# Patient Record
Sex: FEMALE | Race: WHITE | NOT HISPANIC OR LATINO
[De-identification: names, ages, dates, MRNs, and addresses within clinical notes are randomized per-mention and may not be internally consistent; named-entity substitution may affect disease eponyms.]

---

## 2017-03-22 ENCOUNTER — TRANSCRIPTION ENCOUNTER (OUTPATIENT)
Age: 47
End: 2017-03-22

## 2022-08-19 ENCOUNTER — NON-APPOINTMENT (OUTPATIENT)
Age: 52
End: 2022-08-19

## 2024-06-11 ENCOUNTER — INPATIENT (INPATIENT)
Facility: HOSPITAL | Age: 54
LOS: 0 days | Discharge: HOME | End: 2024-06-12
Attending: HOSPITALIST | Admitting: HOSPITALIST
Payer: COMMERCIAL

## 2024-06-11 VITALS
WEIGHT: 250 LBS | TEMPERATURE: 98 F | HEART RATE: 68 BPM | RESPIRATION RATE: 17 BRPM | DIASTOLIC BLOOD PRESSURE: 80 MMHG | HEIGHT: 68 IN | OXYGEN SATURATION: 100 % | SYSTOLIC BLOOD PRESSURE: 128 MMHG

## 2024-06-11 DIAGNOSIS — I82.409 ACUTE EMBOLISM AND THROMBOSIS OF UNSPECIFIED DEEP VEINS OF UNSPECIFIED LOWER EXTREMITY: ICD-10-CM

## 2024-06-11 DIAGNOSIS — Z29.9 ENCOUNTER FOR PROPHYLACTIC MEASURES, UNSPECIFIED: ICD-10-CM

## 2024-06-11 DIAGNOSIS — I82.402 ACUTE EMBOLISM AND THROMBOSIS OF UNSPECIFIED DEEP VEINS OF LEFT LOWER EXTREMITY: ICD-10-CM

## 2024-06-11 DIAGNOSIS — I10 ESSENTIAL (PRIMARY) HYPERTENSION: ICD-10-CM

## 2024-06-11 LAB
ALBUMIN SERPL ELPH-MCNC: 4 G/DL — SIGNIFICANT CHANGE UP (ref 3.3–5)
ALP SERPL-CCNC: 85 U/L — SIGNIFICANT CHANGE UP (ref 40–120)
ALT FLD-CCNC: 13 U/L — SIGNIFICANT CHANGE UP (ref 4–33)
ANION GAP SERPL CALC-SCNC: 14 MMOL/L — SIGNIFICANT CHANGE UP (ref 7–14)
APTT BLD: 25.8 SEC — SIGNIFICANT CHANGE UP (ref 24.5–35.6)
APTT BLD: >200 SEC — SIGNIFICANT CHANGE UP (ref 24.5–35.6)
AST SERPL-CCNC: 33 U/L — HIGH (ref 4–32)
BASOPHILS # BLD AUTO: 0.05 K/UL — SIGNIFICANT CHANGE UP (ref 0–0.2)
BASOPHILS NFR BLD AUTO: 0.6 % — SIGNIFICANT CHANGE UP (ref 0–2)
BILIRUB SERPL-MCNC: 0.7 MG/DL — SIGNIFICANT CHANGE UP (ref 0.2–1.2)
BUN SERPL-MCNC: 12 MG/DL — SIGNIFICANT CHANGE UP (ref 7–23)
CALCIUM SERPL-MCNC: 9.5 MG/DL — SIGNIFICANT CHANGE UP (ref 8.4–10.5)
CHLORIDE SERPL-SCNC: 107 MMOL/L — SIGNIFICANT CHANGE UP (ref 98–107)
CO2 SERPL-SCNC: 20 MMOL/L — LOW (ref 22–31)
CREAT SERPL-MCNC: 0.68 MG/DL — SIGNIFICANT CHANGE UP (ref 0.5–1.3)
CRP SERPL-MCNC: 17.1 MG/L — HIGH
EGFR: 104 ML/MIN/1.73M2 — SIGNIFICANT CHANGE UP
EOSINOPHIL # BLD AUTO: 0.42 K/UL — SIGNIFICANT CHANGE UP (ref 0–0.5)
EOSINOPHIL NFR BLD AUTO: 5.3 % — SIGNIFICANT CHANGE UP (ref 0–6)
ERYTHROCYTE [SEDIMENTATION RATE] IN BLOOD: 11 MM/HR — SIGNIFICANT CHANGE UP (ref 4–25)
GLUCOSE SERPL-MCNC: 95 MG/DL — SIGNIFICANT CHANGE UP (ref 70–99)
HCT VFR BLD CALC: 40 % — SIGNIFICANT CHANGE UP (ref 34.5–45)
HCT VFR BLD CALC: 44.4 % — SIGNIFICANT CHANGE UP (ref 34.5–45)
HGB BLD-MCNC: 12.9 G/DL — SIGNIFICANT CHANGE UP (ref 11.5–15.5)
HGB BLD-MCNC: 14.4 G/DL — SIGNIFICANT CHANGE UP (ref 11.5–15.5)
IANC: 4.92 K/UL — SIGNIFICANT CHANGE UP (ref 1.8–7.4)
IMM GRANULOCYTES NFR BLD AUTO: 0.4 % — SIGNIFICANT CHANGE UP (ref 0–0.9)
INR BLD: 0.98 RATIO — SIGNIFICANT CHANGE UP (ref 0.85–1.18)
LYMPHOCYTES # BLD AUTO: 1.96 K/UL — SIGNIFICANT CHANGE UP (ref 1–3.3)
LYMPHOCYTES # BLD AUTO: 24.9 % — SIGNIFICANT CHANGE UP (ref 13–44)
MCHC RBC-ENTMCNC: 29.7 PG — SIGNIFICANT CHANGE UP (ref 27–34)
MCHC RBC-ENTMCNC: 30.1 PG — SIGNIFICANT CHANGE UP (ref 27–34)
MCHC RBC-ENTMCNC: 32.3 GM/DL — SIGNIFICANT CHANGE UP (ref 32–36)
MCHC RBC-ENTMCNC: 32.4 GM/DL — SIGNIFICANT CHANGE UP (ref 32–36)
MCV RBC AUTO: 92 FL — SIGNIFICANT CHANGE UP (ref 80–100)
MCV RBC AUTO: 92.7 FL — SIGNIFICANT CHANGE UP (ref 80–100)
MONOCYTES # BLD AUTO: 0.49 K/UL — SIGNIFICANT CHANGE UP (ref 0–0.9)
MONOCYTES NFR BLD AUTO: 6.2 % — SIGNIFICANT CHANGE UP (ref 2–14)
NEUTROPHILS # BLD AUTO: 4.92 K/UL — SIGNIFICANT CHANGE UP (ref 1.8–7.4)
NEUTROPHILS NFR BLD AUTO: 62.6 % — SIGNIFICANT CHANGE UP (ref 43–77)
NRBC # BLD: 0 /100 WBCS — SIGNIFICANT CHANGE UP (ref 0–0)
NRBC # BLD: 0 /100 WBCS — SIGNIFICANT CHANGE UP (ref 0–0)
NRBC # FLD: 0 K/UL — SIGNIFICANT CHANGE UP (ref 0–0)
NRBC # FLD: 0 K/UL — SIGNIFICANT CHANGE UP (ref 0–0)
PLATELET # BLD AUTO: 329 K/UL — SIGNIFICANT CHANGE UP (ref 150–400)
PLATELET # BLD AUTO: 338 K/UL — SIGNIFICANT CHANGE UP (ref 150–400)
POTASSIUM SERPL-MCNC: 5.1 MMOL/L — SIGNIFICANT CHANGE UP (ref 3.5–5.3)
POTASSIUM SERPL-SCNC: 5.1 MMOL/L — SIGNIFICANT CHANGE UP (ref 3.5–5.3)
PROT SERPL-MCNC: 7.6 G/DL — SIGNIFICANT CHANGE UP (ref 6–8.3)
PROTHROM AB SERPL-ACNC: 11 SEC — SIGNIFICANT CHANGE UP (ref 9.5–13)
RBC # BLD: 4.35 M/UL — SIGNIFICANT CHANGE UP (ref 3.8–5.2)
RBC # BLD: 4.79 M/UL — SIGNIFICANT CHANGE UP (ref 3.8–5.2)
RBC # FLD: 14 % — SIGNIFICANT CHANGE UP (ref 10.3–14.5)
RBC # FLD: 14.1 % — SIGNIFICANT CHANGE UP (ref 10.3–14.5)
SODIUM SERPL-SCNC: 141 MMOL/L — SIGNIFICANT CHANGE UP (ref 135–145)
WBC # BLD: 7.87 K/UL — SIGNIFICANT CHANGE UP (ref 3.8–10.5)
WBC # BLD: 8.78 K/UL — SIGNIFICANT CHANGE UP (ref 3.8–10.5)
WBC # FLD AUTO: 7.87 K/UL — SIGNIFICANT CHANGE UP (ref 3.8–10.5)
WBC # FLD AUTO: 8.78 K/UL — SIGNIFICANT CHANGE UP (ref 3.8–10.5)

## 2024-06-11 PROCEDURE — 99223 1ST HOSP IP/OBS HIGH 75: CPT | Mod: GC

## 2024-06-11 PROCEDURE — 93970 EXTREMITY STUDY: CPT | Mod: 26

## 2024-06-11 PROCEDURE — 99285 EMERGENCY DEPT VISIT HI MDM: CPT

## 2024-06-11 PROCEDURE — 73564 X-RAY EXAM KNEE 4 OR MORE: CPT | Mod: 26,LT

## 2024-06-11 PROCEDURE — 99232 SBSQ HOSP IP/OBS MODERATE 35: CPT | Mod: GC

## 2024-06-11 PROCEDURE — 74177 CT ABD & PELVIS W/CONTRAST: CPT | Mod: 26

## 2024-06-11 RX ORDER — HEPARIN SODIUM 5000 [USP'U]/ML
5000 INJECTION INTRAVENOUS; SUBCUTANEOUS EVERY 6 HOURS
Refills: 0 | Status: DISCONTINUED | OUTPATIENT
Start: 2024-06-11 | End: 2024-06-12

## 2024-06-11 RX ORDER — HEPARIN SODIUM 5000 [USP'U]/ML
10000 INJECTION INTRAVENOUS; SUBCUTANEOUS ONCE
Refills: 0 | Status: COMPLETED | OUTPATIENT
Start: 2024-06-11 | End: 2024-06-11

## 2024-06-11 RX ORDER — HEPARIN SODIUM 5000 [USP'U]/ML
INJECTION INTRAVENOUS; SUBCUTANEOUS
Qty: 25000 | Refills: 0 | Status: DISCONTINUED | OUTPATIENT
Start: 2024-06-11 | End: 2024-06-12

## 2024-06-11 RX ORDER — HEPARIN SODIUM 5000 [USP'U]/ML
10000 INJECTION INTRAVENOUS; SUBCUTANEOUS EVERY 6 HOURS
Refills: 0 | Status: DISCONTINUED | OUTPATIENT
Start: 2024-06-11 | End: 2024-06-12

## 2024-06-11 RX ORDER — DIPHENHYDRAMINE HCL 50 MG
50 CAPSULE ORAL ONCE
Refills: 0 | Status: COMPLETED | OUTPATIENT
Start: 2024-06-11 | End: 2024-06-11

## 2024-06-11 RX ORDER — ACETAMINOPHEN 500 MG
650 TABLET ORAL EVERY 6 HOURS
Refills: 0 | Status: DISCONTINUED | OUTPATIENT
Start: 2024-06-11 | End: 2024-06-12

## 2024-06-11 RX ADMIN — HEPARIN SODIUM 10000 UNIT(S): 5000 INJECTION INTRAVENOUS; SUBCUTANEOUS at 12:15

## 2024-06-11 RX ADMIN — HEPARIN SODIUM UNIT(S)/HR: 5000 INJECTION INTRAVENOUS; SUBCUTANEOUS at 18:58

## 2024-06-11 RX ADMIN — HEPARIN SODIUM 0 UNIT(S)/HR: 5000 INJECTION INTRAVENOUS; SUBCUTANEOUS at 20:10

## 2024-06-11 RX ADMIN — Medication 125 MILLIGRAM(S): at 17:24

## 2024-06-11 RX ADMIN — HEPARIN SODIUM 2200 UNIT(S)/HR: 5000 INJECTION INTRAVENOUS; SUBCUTANEOUS at 12:23

## 2024-06-11 RX ADMIN — Medication 50 MILLIGRAM(S): at 21:24

## 2024-06-11 NOTE — H&P ADULT - PROBLEM SELECTOR PLAN 3
Diet: Regular diet   DVT: on Heparin ggt  Dispo: pending clinical course Diet: Regular diet   DVT ppx: on Heparin gtt  Dispo: pending clinical course

## 2024-06-11 NOTE — CONSULT NOTE ADULT - ATTENDING COMMENTS
Patient with acute LLE pain found to have an extensive DVT from CFV to gastrocnemius veins. On hep gtt. C/O venous claudication.  Wisdom score 12. No ulcers or phlegmasia.   CT venogram demonstrates no evidence of extension proximal to inguinal ligament  Given her symptoms, would still be a candidate for mechanical venous thrombectomy  Will discuss with patient

## 2024-06-11 NOTE — H&P ADULT - NSHPREVIEWOFSYSTEMS_GEN_ALL_CORE
REVIEW OF SYSTEMS:    CONSTITUTIONAL: No weakness, fevers or chills  EYES/ENT: No visual changes;  No vertigo or throat pain   NECK: No pain or stiffness  RESPIRATORY: No cough, wheezing, hemoptysis; No shortness of breath  CARDIOVASCULAR: No chest pain or palpitations  GASTROINTESTINAL: No abdominal or epigastric pain. No nausea, vomiting, or hematemesis; No diarrhea or constipation. No melena or hematochezia.  GENITOURINARY: No dysuria, frequency or hematuria  NEUROLOGICAL: No numbness or weakness  SKIN: No itching, rashes Constitutional: No generalized weakness, fevers, chills, or weight loss  Eyes: No visual changes, double vision, or eye pain  Ears, Nose, Mouth, Throat: No runny nose, sinus pain, ear pain, tinnitus, sore throat, dysphagia, or odynophagia  Cardiovascular: No chest pain, palpitations, or LE edema  Respiratory: No cough, wheezing, hemoptysis, or shortness of breath  Gastrointestinal: No abdominal pain, dysphagia, anorexia, nausea/vomiting, diarrhea/constipation, hematemesis, melena, or BRBPR  Genitourinary: No dysuria, frequency, urgency, or hematuria  Musculoskeletal: +LLE pain and swelling. No neck pain or back pain. No joint pain, swelling, or decreased ROM.  Skin: No pruritus, rashes, lesions, or wounds  Neurologic: No syncope, seizures, headache, paresthesias, numbness, or limb weakness  Psychiatric: No depression, anxiety, difficulty concentrating, anhedonia, or lack of energy  Endocrine: No heat/cold intolerance, mood swings, sweats, polydipsia, or polyuria  Hematologic/lymphatic: No purpura, petechia, or prolonged or excessive bleeding after dental extraction / injury  Allergic/Immunologic: No anaphylaxis or allergic response to materials, foods, animals    Positives and pertinent negatives noted and all other systems negative.

## 2024-06-11 NOTE — CONSULT NOTE ADULT - SUBJECTIVE AND OBJECTIVE BOX
VASCULAR SURGERY CONSULT NOTE    HPI:  55 yo F PMHx of HTN PSHx cholecystectomy presents to ED w/ LLE pain and swelling x1 week. States received L knee injection for osteoarthritis 2 weeks ago. No prolonged immobility, long flights.    In the ED, AVSS. CBC, CMP unremarkable. CRP 17. B/L LE duplex shows acute LLE DVT from below knee gastrocnemius PT veins to the popliteal, femoral and common femoral veins.     No personal or family history of blood clots or blood disorders. No OCP use. No h/o of smoking.     Has never seen vascular surgeon. Has varicose veins, asymptomatic. Denies h/o of LE swelling. Denies h/o claudication, rest pain, non-healing wounds.       PAST MEDICAL HISTORY:  Hypertension         PAST SURGICAL HISTORY:  History of cholecystectomy        MEDICATIONS:  heparin   Injectable 01164 Unit(s) IV Push every 6 hours PRN  heparin   Injectable 5000 Unit(s) IV Push every 6 hours PRN  heparin  Infusion.  Unit(s)/Hr IV Continuous <Continuous>      ALLERGIES:  Allergy Status Unknown      VITALS & I/Os:  Vital Signs Last 24 Hrs  T(C): 36.7 (11 Jun 2024 14:11), Max: 36.7 (11 Jun 2024 14:11)  T(F): 98 (11 Jun 2024 14:11), Max: 98 (11 Jun 2024 14:11)  HR: 68 (11 Jun 2024 14:11) (68 - 68)  BP: 124/69 (11 Jun 2024 14:11) (124/69 - 128/80)  RR: 17 (11 Jun 2024 14:11) (17 - 17)  SpO2: 100% (11 Jun 2024 14:11) (100% - 100%)    Parameters below as of 11 Jun 2024 14:11  Patient On (Oxygen Delivery Method): room air        I&O's Summary      PHYSICAL EXAM:  General: No acute distress  Respiratory: Nonlabored  Cardiovascular: RRR  Abdominal: Soft, nondistended, nontender. No rebound or guarding.  Extremities: B/L palpable femoral, popliteal, pedal pulses. B/L LE varicose veins.   LLE edema. Pain on calf compression. Sensory and motor intact, no paresthesias, brisk capillary refill     LABS:                        14.4   7.87  )-----------( 338      ( 11 Jun 2024 11:30 )             44.4     06-11    141  |  107  |  12  ----------------------------<  95  5.1   |  20<L>  |  0.68    Ca    9.5      11 Jun 2024 11:30    TPro  7.6  /  Alb  4.0  /  TBili  0.7  /  DBili  x   /  AST  33<H>  /  ALT  13  /  AlkPhos  85  06-11    Lactate:    PT/INR - ( 11 Jun 2024 11:30 )   PT: 11.0 sec;   INR: 0.98 ratio         PTT - ( 11 Jun 2024 11:30 )  PTT:25.8 sec          Urinalysis Basic - ( 11 Jun 2024 11:30 )    Color: x / Appearance: x / SG: x / pH: x  Gluc: 95 mg/dL / Ketone: x  / Bili: x / Urobili: x   Blood: x / Protein: x / Nitrite: x   Leuk Esterase: x / RBC: x / WBC x   Sq Epi: x / Non Sq Epi: x / Bacteria: x     VASCULAR SURGERY CONSULT NOTE    HPI:  53 yo F PMHx of HTN PSHx cholecystectomy presents to ED w/ LLE pain and swelling x1 week. States received L knee injection for osteoarthritis 2 weeks ago. No prolonged immobility, long flights.    In the ED, AVSS. CBC, CMP unremarkable. CRP 17. B/L LE duplex shows acute LLE DVT from below knee gastrocnemius PT veins to the popliteal, femoral and common femoral veins.     No personal or family history of blood clots or blood disorders. No OCP use. No h/o of smoking.     Has never seen vascular surgeon. Has varicose veins, asymptomatic. Denies h/o of LE swelling. Denies h/o claudication, rest pain, non-healing wounds.       PAST MEDICAL HISTORY:  Hypertension         PAST SURGICAL HISTORY:  History of cholecystectomy        MEDICATIONS:  heparin   Injectable 44192 Unit(s) IV Push every 6 hours PRN  heparin   Injectable 5000 Unit(s) IV Push every 6 hours PRN  heparin  Infusion.  Unit(s)/Hr IV Continuous <Continuous>      ALLERGIES:  Allergy Status Unknown      VITALS & I/Os:  Vital Signs Last 24 Hrs  T(C): 36.7 (11 Jun 2024 14:11), Max: 36.7 (11 Jun 2024 14:11)  T(F): 98 (11 Jun 2024 14:11), Max: 98 (11 Jun 2024 14:11)  HR: 68 (11 Jun 2024 14:11) (68 - 68)  BP: 124/69 (11 Jun 2024 14:11) (124/69 - 128/80)  RR: 17 (11 Jun 2024 14:11) (17 - 17)  SpO2: 100% (11 Jun 2024 14:11) (100% - 100%)    Parameters below as of 11 Jun 2024 14:11  Patient On (Oxygen Delivery Method): room air        I&O's Summary      PHYSICAL EXAM:  General: No acute distress  Respiratory: Nonlabored  Cardiovascular: RRR  Abdominal: Soft, nondistended, nontender. No rebound or guarding.  Extremities: B/L palpable femoral, popliteal, pedal pulses. B/L LE varicose veins.   LLE edema. Pain on calf compression. Sensory and motor intact, no paresthesias, brisk capillary refill     LABS:                        14.4   7.87  )-----------( 338      ( 11 Jun 2024 11:30 )             44.4     06-11    141  |  107  |  12  ----------------------------<  95  5.1   |  20<L>  |  0.68    Ca    9.5      11 Jun 2024 11:30    TPro  7.6  /  Alb  4.0  /  TBili  0.7  /  DBili  x   /  AST  33<H>  /  ALT  13  /  AlkPhos  85  06-11    Lactate:    PT/INR - ( 11 Jun 2024 11:30 )   PT: 11.0 sec;   INR: 0.98 ratio         PTT - ( 11 Jun 2024 11:30 )  PTT:25.8 sec          Urinalysis Basic - ( 11 Jun 2024 11:30 )    Color: x / Appearance: x / SG: x / pH: x  Gluc: 95 mg/dL / Ketone: x  / Bili: x / Urobili: x   Blood: x / Protein: x / Nitrite: x   Leuk Esterase: x / RBC: x / WBC x   Sq Epi: x / Non Sq Epi: x / Bacteria: x

## 2024-06-11 NOTE — ED ADULT NURSE NOTE - OBJECTIVE STATEMENT
Pt presenting to the ed with a chief complaint of L lower leg swelling, pain and warmth to touch since Saturday. Pt states she received corticosteroid injections in her L knee prior to the swelling. Pt rates the pain 4 out of 10 when resting. 10 out of 10 when walking. Pt denies any shortness of breath, chest pain, recent travel, long car rides, and history of DVTs.     Pt A&O x 4, PERRLA, respirations even and unlabored, LLE(+) edema, TTP, erythema. pulses intact. b/l LE. Labs/imaging plan of care reviewed, safety maintained, call bell within reach, son at bedside. Will to continue to monitor patient.

## 2024-06-11 NOTE — CONSULT NOTE ADULT - ASSESSMENT
53 yo F PMHx of HTN PSHx cholecystectomy presents to ED w/ LLE pain and swelling x1 week found to have acute LLE DVT from below knee gastrocnemius PT veins to the popliteal, femoral and common femoral veins.     Recommendations:   - LLE above and below knee DVT, proximal extent on LE duplex to common femoral vein     - No signs of phlegmasia. Wisdom score 12.      - Recommend CT venogram A/P to evaluate proximal extent of DVT      - Continue therapeutic AC, on heparin ggt   - Recommend medicine admission and hypercoagulable w/u   - Will follow above to evaluate candidacy for thrombectomy       Discussed w/ attending     Vascular Surgery   [id number]

## 2024-06-11 NOTE — H&P ADULT - PROBLEM SELECTOR PLAN 1
- presenting with LLE pain and swelling x 1 week duration   - VA duplex: showing Acute DVT involving the left common femoral, femoral, popliteal, gastrocnemius, and posterior tibial veins.  - no inciting factor: no recent prolonged immobilization, no trauma, no history of blood clots, no fhx, no OCP/hormone use, no smoking history   - start on heparin drip   - vascular consulted, appreciate recommendations, patient will be evaluated for candidacy for thrombectomy   - CT venogram to evaluate for proximal extension of DVT.  - hypercoagulable workup - presenting with LLE pain and swelling x 1 week duration   - VA duplex: showing Acute DVT involving the left common femoral, femoral, popliteal, gastrocnemius, and posterior tibial veins.  - no inciting factor: no recent prolonged immobilization, no trauma, no history of blood clots, no fhx, no OCP/hormone use, no smoking history   - start on heparin drip   - vascular consulted, appreciate recommendations, patient will be evaluated for candidacy for thrombectomy   - CT venogram to evaluate for proximal extension of DVT.  - hypercoagulable workup  - consider hematology consult inpatient versus outpatient given extensive DVT - presenting with LLE pain and swelling x 1 week duration   - VA duplex: showing acute DVT involving the left common femoral, femoral, popliteal, gastrocnemius, and posterior tibial veins.  - no inciting factor: no recent prolonged immobilization, no trauma, no personal or family history of blood clots, no OCP/hormone use, no smoking history   - pt, however, not up to date with age-appropriate cancer screening  - start on heparin gtt  - vascular consulted, appreciate recommendations, patient will be evaluated for candidacy for thrombectomy   - CT venogram to evaluate for proximal extension of DVT  - hypercoagulable workup (APLS Abs, Factor V Leiden, Prothrombin gene mutation) during admission and as outpatient  - referral on discharge for malignancy workup, including colonoscopy, mammogram, and Pap smear  - consider hematology consult inpatient versus outpatient given extensive DVT

## 2024-06-11 NOTE — H&P ADULT - ASSESSMENT
54-year-old female with past medical history of HTN, presenting with left lower extremity pain and swelling of one week duration 54-year-old female with past medical history of HTN, presenting with left lower extremity pain and swelling of one week duration, US demonstrating acute LLE DVT, admitted for further management of acute DVT.

## 2024-06-11 NOTE — H&P ADULT - NSHPPHYSICALEXAM_GEN_ALL_CORE
PHYSICAL EXAM:  GENERAL: NAD, well-groomed, well-developed  HEAD:  Atraumatic, Normocephalic  EYES: EOMI, PERRLA, conjunctiva and sclera clear  ENMT: No tonsillar erythema, exudates, or enlargement; Moist mucous membranes  NECK: Supple, No JVD, Normal thyroid  HEART: Regular rate and rhythm; No murmurs, rubs, or gallops  RESPIRATORY: CTA B/L, No W/R/R  ABDOMEN: Soft, Nontender, Nondistended; Bowel sounds present  NEUROLOGY: A&Ox3, nonfocal, moving all extremities  EXTREMITIES:  2+ Peripheral Pulses, No clubbing, cyanosis, or edema. LLE swelling > RLE, tender to palpation of left calf, mildly warm to touch, no erythema. Full ROM, sensations intact.   SKIN: warm, dry, normal color, no rash or abnormal lesions Vital Signs Last 24 Hrs  T(C): 36.4 (12 Jun 2024 05:20), Max: 36.9 (11 Jun 2024 22:00)  T(F): 97.5 (12 Jun 2024 05:20), Max: 98.4 (11 Jun 2024 22:00)  HR: 104 (12 Jun 2024 11:01) (68 - 104)  BP: 121/92 (12 Jun 2024 11:01) (118/80 - 134/68)  BP(mean): --  RR: 17 (12 Jun 2024 05:20) (16 - 17)  SpO2: 96% (12 Jun 2024 05:20) (96% - 100%)    Parameters below as of 12 Jun 2024 05:20  Patient On (Oxygen Delivery Method): room air    PHYSICAL EXAM:  GENERAL: NAD, well-groomed, well-developed  HEAD:  Atraumatic, Normocephalic  EYES: EOMI, PERRLA, conjunctiva and sclera clear  ENMT: No tonsillar erythema, exudates, or enlargement; Moist mucous membranes  NECK: Supple, No JVD, Normal thyroid  HEART: Regular rate and rhythm; No murmurs, rubs, or gallops  RESPIRATORY: CTA B/L, No W/R/R  ABDOMEN: Soft, Nontender, Nondistended; Bowel sounds present  NEUROLOGY: A&Ox3, nonfocal, moving all extremities  EXTREMITIES:  2+ Peripheral Pulses, No clubbing, cyanosis, or edema. LLE swelling > RLE, tender to palpation of left calf, mildly warm to touch, no erythema. Full ROM, sensations intact.   SKIN: warm, dry, normal color, no rash or abnormal lesions

## 2024-06-11 NOTE — H&P ADULT - PROBLEM SELECTOR PLAN 2
- history of HTN - history of HTN  - takes losartan 25mg as needed for SBP > 130   - will hold for now, monitor BP - history of HTN  - takes losartan 25mg as needed for SBP > 130   - can continue losartan 25mg - history of HTN  - takes losartan 25mg daily as needed for SBP > 130   - can continue losartan 25mg daily as needed

## 2024-06-11 NOTE — ED PROVIDER NOTE - CLINICAL SUMMARY MEDICAL DECISION MAKING FREE TEXT BOX
54-year-old female past medical history HTN presents with 3 days of left lower extremity pain and swelling.  Patient reports worsening pain  over the last several days.  No trauma or no falls to the area.  no recent travel hospitalizations surgeries.  No hormone use.  No history of clots.  No family history of clots.  Patient had an injection in the left knee for osteoarthritis 2 weeks ago.  Denies any fevers or chills.  Denies any change in sensation or weakness.  Patient is able to ambulate.   Denies any chest pain shortness of breath.  On arrival vital signs stable.  Exam with well-appearing female no acute distress.  Regular rate and rhythm.  Lungs clear bilaterally.  Abdomen soft nontender nondistended.  Left lower extremity with swelling greater than right.  Tenderness to left calf and into the popliteal region.  Warm to touch.  No erythema  or induration.  Patient able to range knee and ankle fully.  Neurovascular intact distally.  presentation concerning for DVT.  lower concern for infectious etiology versus fracture or dislocation.  Plan for labs x-ray ultrasound and reassess.  Patient declining analgesia at this time.

## 2024-06-11 NOTE — ED ADULT NURSE NOTE - NSFALLUNIVINTERV_ED_ALL_ED
Bed/Stretcher in lowest position, wheels locked, appropriate side rails in place/Call bell, personal items and telephone in reach/Instruct patient to call for assistance before getting out of bed/chair/stretcher/Non-slip footwear applied when patient is off stretcher/Cost to call system/Physically safe environment - no spills, clutter or unnecessary equipment/Purposeful proactive rounding/Room/bathroom lighting operational, light cord in reach

## 2024-06-11 NOTE — H&P ADULT - HISTORY OF PRESENT ILLNESS
54-year-old female with pmh of HTN, presenting with left lower extremity swelling x 1 week duration, worsening over the past few days. Patient denies any trauma, no falls, no fevers, or chills. Patient is able to ambulate and no loss of sensation or weakness, denies claudication. Patient recently had an injection of L knee for osteoarthritis 2 weeks ago. Patient denies any recent travel, no prolonged immobilization, no hospitalization, no personal history of clots, no usage of OCP or hormones, no family history of clots, no smoking history. In ED, VSS, labs notable for CRP 17, b/l LE duplex showing acute LLE DVT extending from left common femoral, femoral, popliteal, gastrocnemius, and posterior tibial veins. Patient admitted for management of acute DVT.  54-year-old female with pmh of HTN, presenting with left lower extremity swelling x 1 week duration, worsening over the past few days. Patient denies any trauma, no falls, no fevers, or chills. Patient is able to ambulate and no loss of sensation or weakness, denies claudication. Patient recently had an injection of L knee for osteoarthritis 2 weeks ago. Patient has a history of arthritis and reports outpatient MRI of knee was done which showed possible muscular tear. Patient denies any recent travel, no prolonged immobilization, no hospitalization, no personal history of clots, no usage of OCP or hormones, no family history of clots, no smoking history. In ED, VSS, labs notable for CRP 17, b/l LE duplex showing acute LLE DVT extending from left common femoral, femoral, popliteal, gastrocnemius, and posterior tibial veins. Patient admitted for management of acute DVT.  54-year-old female with pmh of HTN presenting with left lower extremity swelling x 1 week duration, worsening over the past few days. Also with pain of left lower extremity, especially in her calf upon walking. Patient denies any trauma, no falls, fevers, or chills. Patient is able to ambulate and reports no loss of sensation or weakness, denies claudication. Patient recently had an injection of L knee for osteoarthritis 2 weeks ago. Patient has a history of arthritis and reports outpatient MRI of knee was done which showed possible muscular tear. Patient denies any recent travel, no prolonged immobilization, no hospitalization, no personal history of clots, no usage of OCP or hormone replacement therapy, no family history of clots, no smoking history. Patient admits to not being up to date with cancer screening, including colonoscopy, mammogram, and Pap smear. In ED, VSS, labs notable for CRP 17, b/l LE duplex showing acute LLE DVT extending from left common femoral, femoral, popliteal, gastrocnemius, and posterior tibial veins. Patient admitted for management of acute DVT.

## 2024-06-11 NOTE — ED ADULT NURSE NOTE - DOES PATIENT HAVE ADVANCE DIRECTIVE
No ROM intact/strength intact/straightened/loss of curvature ROM intact/strength intact/kyphosis/straightened/loss of curvature

## 2024-06-11 NOTE — ED PROVIDER NOTE - ATTENDING CONTRIBUTION TO CARE
With above, patient with tenderness to left calf and popliteal fossa, no overlying erythema but some swelling.  Concern for Baker's cyst versus DVT, pending ultrasound.

## 2024-06-11 NOTE — PATIENT PROFILE ADULT - FALL HARM RISK - HARM RISK INTERVENTIONS
Assistance with ambulation/Assistance OOB with selected safe patient handling equipment/Communicate Risk of Fall with Harm to all staff/Discuss with provider need for PT consult/Monitor gait and stability/Reinforce activity limits and safety measures with patient and family/Review medications for side effects contributing to fall risk/Sit up slowly, dangle for a short time, stand at bedside before walking/Tailored Fall Risk Interventions/Toileting schedule using arm’s reach rule for commode and bathroom/Visual Cue: Yellow wristband and red socks/Bed in lowest position, wheels locked, appropriate side rails in place/Call bell, personal items and telephone in reach/Instruct patient to call for assistance before getting out of bed or chair/Non-slip footwear when patient is out of bed/[place] to call system/Physically safe environment - no spills, clutter or unnecessary equipment/Purposeful Proactive Rounding/Room/bathroom lighting operational, light cord in reach

## 2024-06-11 NOTE — ED PROVIDER NOTE - PROGRESS NOTE DETAILS
Roselia Modi DO ([id number]); Patient signed out to me by day team.  Patient has DVT, currently on heparin.  Patient seen by vascular, recommending medicine admission and CT venogram to evaluate for further extension.  Contrast allergy so will be pretreated in order to obtain CT scan.  Contrast allergy is hives, no anaphylaxis.  Patient already received Solu-Medrol, will get Benadryl at 9:30 PM and CT scan at 10:30 PM.  Patient to be admitted to medicine, hospitalist paged.

## 2024-06-11 NOTE — H&P ADULT - ATTENDING COMMENTS
54-year-old female with past medical history of HTN, presenting with left lower extremity pain and swelling x1 week, found to have extensive LLE DVT involving left common femoral, femoral, popliteal, gastrocnemius, and posterior tibial veins. Vascular surgery consulted. CTAP with IV contrast pending to evaluate extension of DVT. Currently on heparin gtt.

## 2024-06-11 NOTE — H&P ADULT - NSHPLABSRESULTS_GEN_ALL_CORE
.  LABS:                         12.9   8.78  )-----------( 329      ( 11 Jun 2024 18:10 )             40.0     06-11    141  |  107  |  12  ----------------------------<  95  5.1   |  20<L>  |  0.68    Ca    9.5      11 Jun 2024 11:30    TPro  7.6  /  Alb  4.0  /  TBili  0.7  /  DBili  x   /  AST  33<H>  /  ALT  13  /  AlkPhos  85  06-11    PT/INR - ( 11 Jun 2024 11:30 )   PT: 11.0 sec;   INR: 0.98 ratio         PTT - ( 11 Jun 2024 18:10 )  PTT:>200 sec  Urinalysis Basic - ( 11 Jun 2024 11:30 )    Color: x / Appearance: x / SG: x / pH: x  Gluc: 95 mg/dL / Ketone: x  / Bili: x / Urobili: x   Blood: x / Protein: x / Nitrite: x   Leuk Esterase: x / RBC: x / WBC x   Sq Epi: x / Non Sq Epi: x / Bacteria: x            RADIOLOGY, EKG & ADDITIONAL TESTS: Reviewed. Labs personally reviewed.                         12.9   8.78  )-----------( 329      ( 11 Jun 2024 18:10 )             40.0     06-11    141  |  107  |  12  ----------------------------<  95  5.1   |  20<L>  |  0.68    Ca    9.5      11 Jun 2024 11:30    TPro  7.6  /  Alb  4.0  /  TBili  0.7  /  DBili  x   /  AST  33<H>  /  ALT  13  /  AlkPhos  85  06-11    PT/INR - ( 11 Jun 2024 11:30 )   PT: 11.0 sec;   INR: 0.98 ratio    PTT - ( 11 Jun 2024 18:10 )  PTT:>200 sec    Urinalysis Basic - ( 11 Jun 2024 11:30 )  Color: x / Appearance: x / SG: x / pH: x  Gluc: 95 mg/dL / Ketone: x  / Bili: x / Urobili: x   Blood: x / Protein: x / Nitrite: x   Leuk Esterase: x / RBC: x / WBC x   Sq Epi: x / Non Sq Epi: x / Bacteria: x    Imaging personally reviewed.  ACC: [id number] EXAM:  US DPLX LWR EXT VEINS COMPL BI   ORDERED BY: SHAKILA GORDON     PROCEDURE DATE:  06/11/2024      INTERPRETATION:  CLINICAL INFORMATION: Leg swelling.    COMPARISON: None available.    TECHNIQUE: Duplex sonography of the BILATERAL LOWER extremity veins with   color and spectral Doppler, with and without compression.    FINDINGS:    RIGHT:  Normal compressibility of the RIGHT common femoral, femoral and popliteal   veins.  Doppler examination shows normal spontaneous and phasic flow.  Limited visualized of the RIGHT calf veins. No RIGHT calf vein thrombosis   detected.    LEFT:  Acute DVT involving the left common femoral, femoral, popliteal,   gastrocnemius, and posterior tibial veins.    IMPRESSION:  Acute deep venous thrombosis: above and below the knee.    Acute DVT involving the left common femoral, femoral, popliteal,   gastrocnemius, and posterior tibial veins.

## 2024-06-11 NOTE — ED ADULT NURSE REASSESSMENT NOTE - NS ED NURSE REASSESS COMMENT FT1
As per lab APTT >200, heparin drip stopped at this point x 1 hr as per Heparin nomogram. MD rios
received report from hitesh CURTIS. Pt is a/o x 3. no complaints of chest pain, headache, nausea, dizzniness, vomiting, SOB, fever, chills   verbalized. Pt has 20g iv placed to right and left  AC with no redness or swelling noted. pt has heparin running at 18ml/hr. Pt educated on s/s of bleeding with proper feed back.  Awaiting further orders. Will continue to monitor.

## 2024-06-12 ENCOUNTER — TRANSCRIPTION ENCOUNTER (OUTPATIENT)
Age: 54
End: 2024-06-12

## 2024-06-12 VITALS
SYSTOLIC BLOOD PRESSURE: 120 MMHG | OXYGEN SATURATION: 95 % | HEART RATE: 75 BPM | TEMPERATURE: 98 F | DIASTOLIC BLOOD PRESSURE: 67 MMHG | RESPIRATION RATE: 18 BRPM

## 2024-06-12 LAB
ANION GAP SERPL CALC-SCNC: 14 MMOL/L — SIGNIFICANT CHANGE UP (ref 7–14)
APTT BLD: 167 SEC — SIGNIFICANT CHANGE UP (ref 24.5–35.6)
APTT BLD: 66.1 SEC — HIGH (ref 24.5–35.6)
AT III ACT/NOR PPP CHRO: 88 % — SIGNIFICANT CHANGE UP (ref 85–135)
BUN SERPL-MCNC: 12 MG/DL — SIGNIFICANT CHANGE UP (ref 7–23)
CALCIUM SERPL-MCNC: 9.7 MG/DL — SIGNIFICANT CHANGE UP (ref 8.4–10.5)
CHLORIDE SERPL-SCNC: 103 MMOL/L — SIGNIFICANT CHANGE UP (ref 98–107)
CO2 SERPL-SCNC: 21 MMOL/L — LOW (ref 22–31)
CREAT SERPL-MCNC: 0.62 MG/DL — SIGNIFICANT CHANGE UP (ref 0.5–1.3)
EGFR: 106 ML/MIN/1.73M2 — SIGNIFICANT CHANGE UP
GLUCOSE SERPL-MCNC: 132 MG/DL — HIGH (ref 70–99)
HCT VFR BLD CALC: 41.2 % — SIGNIFICANT CHANGE UP (ref 34.5–45)
HCT VFR BLD CALC: 41.4 % — SIGNIFICANT CHANGE UP (ref 34.5–45)
HGB BLD-MCNC: 13.3 G/DL — SIGNIFICANT CHANGE UP (ref 11.5–15.5)
HGB BLD-MCNC: 13.7 G/DL — SIGNIFICANT CHANGE UP (ref 11.5–15.5)
MAGNESIUM SERPL-MCNC: 2 MG/DL — SIGNIFICANT CHANGE UP (ref 1.6–2.6)
MCHC RBC-ENTMCNC: 29.4 PG — SIGNIFICANT CHANGE UP (ref 27–34)
MCHC RBC-ENTMCNC: 30.2 PG — SIGNIFICANT CHANGE UP (ref 27–34)
MCHC RBC-ENTMCNC: 32.1 GM/DL — SIGNIFICANT CHANGE UP (ref 32–36)
MCHC RBC-ENTMCNC: 33.3 GM/DL — SIGNIFICANT CHANGE UP (ref 32–36)
MCV RBC AUTO: 90.7 FL — SIGNIFICANT CHANGE UP (ref 80–100)
MCV RBC AUTO: 91.6 FL — SIGNIFICANT CHANGE UP (ref 80–100)
NRBC # BLD: 0 /100 WBCS — SIGNIFICANT CHANGE UP (ref 0–0)
NRBC # BLD: 0 /100 WBCS — SIGNIFICANT CHANGE UP (ref 0–0)
NRBC # FLD: 0 K/UL — SIGNIFICANT CHANGE UP (ref 0–0)
NRBC # FLD: 0 K/UL — SIGNIFICANT CHANGE UP (ref 0–0)
PHOSPHATE SERPL-MCNC: 3.9 MG/DL — SIGNIFICANT CHANGE UP (ref 2.5–4.5)
PLATELET # BLD AUTO: 352 K/UL — SIGNIFICANT CHANGE UP (ref 150–400)
PLATELET # BLD AUTO: 382 K/UL — SIGNIFICANT CHANGE UP (ref 150–400)
POTASSIUM SERPL-MCNC: 4.1 MMOL/L — SIGNIFICANT CHANGE UP (ref 3.5–5.3)
POTASSIUM SERPL-SCNC: 4.1 MMOL/L — SIGNIFICANT CHANGE UP (ref 3.5–5.3)
PROT C ACT/NOR PPP: 133 % — SIGNIFICANT CHANGE UP (ref 74–150)
RBC # BLD: 4.52 M/UL — SIGNIFICANT CHANGE UP (ref 3.8–5.2)
RBC # BLD: 4.54 M/UL — SIGNIFICANT CHANGE UP (ref 3.8–5.2)
RBC # FLD: 13.9 % — SIGNIFICANT CHANGE UP (ref 10.3–14.5)
RBC # FLD: 14 % — SIGNIFICANT CHANGE UP (ref 10.3–14.5)
SODIUM SERPL-SCNC: 138 MMOL/L — SIGNIFICANT CHANGE UP (ref 135–145)
WBC # BLD: 11.45 K/UL — HIGH (ref 3.8–10.5)
WBC # BLD: 7.73 K/UL — SIGNIFICANT CHANGE UP (ref 3.8–10.5)
WBC # FLD AUTO: 11.45 K/UL — HIGH (ref 3.8–10.5)
WBC # FLD AUTO: 7.73 K/UL — SIGNIFICANT CHANGE UP (ref 3.8–10.5)

## 2024-06-12 PROCEDURE — 99239 HOSP IP/OBS DSCHRG MGMT >30: CPT | Mod: GC

## 2024-06-12 RX ORDER — LOSARTAN POTASSIUM 100 MG/1
25 TABLET, FILM COATED ORAL DAILY
Refills: 0 | Status: DISCONTINUED | OUTPATIENT
Start: 2024-06-12 | End: 2024-06-12

## 2024-06-12 RX ORDER — APIXABAN 2.5 MG/1
1 TABLET, FILM COATED ORAL
Qty: 60 | Refills: 0
Start: 2024-06-12 | End: 2024-07-11

## 2024-06-12 RX ORDER — LOSARTAN POTASSIUM 100 MG/1
1 TABLET, FILM COATED ORAL
Refills: 0 | DISCHARGE

## 2024-06-12 RX ORDER — HEPARIN SODIUM 5000 [USP'U]/ML
5000 INJECTION INTRAVENOUS; SUBCUTANEOUS EVERY 6 HOURS
Refills: 0 | Status: DISCONTINUED | OUTPATIENT
Start: 2024-06-12 | End: 2024-06-12

## 2024-06-12 RX ORDER — APIXABAN 2.5 MG/1
10 TABLET, FILM COATED ORAL EVERY 12 HOURS
Refills: 0 | Status: DISCONTINUED | OUTPATIENT
Start: 2024-06-12 | End: 2024-06-12

## 2024-06-12 RX ORDER — HEPARIN SODIUM 5000 [USP'U]/ML
10000 INJECTION INTRAVENOUS; SUBCUTANEOUS EVERY 6 HOURS
Refills: 0 | Status: DISCONTINUED | OUTPATIENT
Start: 2024-06-12 | End: 2024-06-12

## 2024-06-12 RX ORDER — HEPARIN SODIUM 5000 [USP'U]/ML
1400 INJECTION INTRAVENOUS; SUBCUTANEOUS
Qty: 25000 | Refills: 0 | Status: DISCONTINUED | OUTPATIENT
Start: 2024-06-12 | End: 2024-06-12

## 2024-06-12 RX ADMIN — HEPARIN SODIUM 1400 UNIT(S)/HR: 5000 INJECTION INTRAVENOUS; SUBCUTANEOUS at 04:32

## 2024-06-12 RX ADMIN — LOSARTAN POTASSIUM 25 MILLIGRAM(S): 100 TABLET, FILM COATED ORAL at 11:32

## 2024-06-12 RX ADMIN — HEPARIN SODIUM 1400 UNIT(S)/HR: 5000 INJECTION INTRAVENOUS; SUBCUTANEOUS at 07:41

## 2024-06-12 RX ADMIN — APIXABAN 10 MILLIGRAM(S): 2.5 TABLET, FILM COATED ORAL at 17:06

## 2024-06-12 RX ADMIN — HEPARIN SODIUM 1400 UNIT(S)/HR: 5000 INJECTION INTRAVENOUS; SUBCUTANEOUS at 12:53

## 2024-06-12 NOTE — DISCHARGE NOTE PROVIDER - CARE PROVIDER_API CALL
Manvar-Singh, Pallavi Buddhadev  Vascular Surgery  [address] [facility] [address], [address]  [place] [address]  Phone: [phone]  Fax: [phone]  Follow Up Time: 1 week    Flakita Cantrell  [address] [place] [address]  Phone: [phone]  Fax: (   )    -  Established Patient  Follow Up Time: 1 week

## 2024-06-12 NOTE — PROGRESS NOTE ADULT - ATTENDING COMMENTS
54-year-old female with past medical history of HTN, presenting with left lower extremity pain and swelling of one week duration, found to have acute LLE DVT.     Pt able to ambulate with mild pain. Denies SOB or CP.     -c/w eliquis  -no intervention from vascular standpoint  -pain control PRN  -Overall DVT appears to be unprovoked. Hypercoagulable w/u can be done as outpt. However, pt should have routine cancer screening which she has not had (breast, colonoscopy, PAP smear). She was instructed on this.     Dc planning for today. Further details outlined in discharge documentation. Spent 36 min in discharge planning and coordination.

## 2024-06-12 NOTE — PROVIDER CONTACT NOTE (CRITICAL VALUE NOTIFICATION) - ASSESSMENT
; no bleeding noted, not in distress, alert and responsive Bactrim Pregnancy And Lactation Text: This medication is Pregnancy Category D and is known to cause fetal risk.  It is also excreted in breast milk.

## 2024-06-12 NOTE — PROGRESS NOTE ADULT - SUBJECTIVE AND OBJECTIVE BOX
***********************************************  Gaudencio Pereyra MD  Internal Medicine   PGY1  ***********************************************      PROGRESS NOTE:     Patient is a 54y old  Female who presents with a chief complaint of LLE DVT (11 Jun 2024 21:04)      SUBJECTIVE / OVERNIGHT EVENTS:    OVERNIGHT:       Pt seen in AM at bedside, resting comfortably in bed, and does not appear to be in any acute distress. When asked, pt denies any recent or active fever, chills, nausea, vomiting, headache, acute sob, chest pain, abdominal pain, genitourinary sx, extremity pain or swelling.          MEDICATIONS  (STANDING):  heparin  Infusion. 1400 Unit(s)/Hr (14 mL/Hr) IV Continuous <Continuous>  losartan 25 milliGRAM(s) Oral daily    MEDICATIONS  (PRN):  acetaminophen     Tablet .. 650 milliGRAM(s) Oral every 6 hours PRN Temp greater or equal to 38C (100.4F), Mild Pain (1 - 3)  heparin   Injectable 88580 Unit(s) IV Push every 6 hours PRN For aPTT less than 40  heparin   Injectable 5000 Unit(s) IV Push every 6 hours PRN For aPTT between 40 - 57      CAPILLARY BLOOD GLUCOSE        I&O's Summary      PHYSICAL EXAM:  Vital Signs Last 24 Hrs  T(C): 36.4 (12 Jun 2024 05:20), Max: 36.9 (11 Jun 2024 22:00)  T(F): 97.5 (12 Jun 2024 05:20), Max: 98.4 (11 Jun 2024 22:00)  HR: 71 (12 Jun 2024 05:20) (68 - 79)  BP: 128/74 (12 Jun 2024 05:20) (118/80 - 134/68)  BP(mean): --  RR: 17 (12 Jun 2024 05:20) (16 - 17)  SpO2: 96% (12 Jun 2024 05:20) (96% - 100%)    Parameters below as of 12 Jun 2024 05:20  Patient On (Oxygen Delivery Method): room air        CONSTITUTIONAL: NAD; well-developed  HEENT: PERRL, clear conjunctiva  RESPIRATORY: Normal respiratory effort; lungs are clear to auscultation bilaterally; No Crackles/Rhonchi/Wheezing  CARDIOVASCULAR: Regular rate and rhythm, normal S1 and S2, no murmur/rub/gallop; No lower extremity edema; Peripheral pulses are 2+ bilaterally  ABDOMEN: Nontender to palpation, normoactive bowel sounds, no rebound/guarding; No hepatosplenomegaly  MUSCULOSKELETAL: no clubbing or cyanosis of digits; no joint swelling or tenderness to palpation  EXTREMITY: Lower extremities Non-tender to palpation; non-erythematous B/L  NEURO: A&Ox3; no focal deficits   PSYCH: normal mood; Affect appropirate    LABS:                        12.9   8.78  )-----------( 329      ( 11 Jun 2024 18:10 )             40.0     06-11    141  |  107  |  12  ----------------------------<  95  5.1   |  20<L>  |  0.68    Ca    9.5      11 Jun 2024 11:30    TPro  7.6  /  Alb  4.0  /  TBili  0.7  /  DBili  x   /  AST  33<H>  /  ALT  13  /  AlkPhos  85  06-11    PT/INR - ( 11 Jun 2024 11:30 )   PT: 11.0 sec;   INR: 0.98 ratio         PTT - ( 12 Jun 2024 02:06 )  PTT:167.0 sec      Urinalysis Basic - ( 11 Jun 2024 11:30 )    Color: x / Appearance: x / SG: x / pH: x  Gluc: 95 mg/dL / Ketone: x  / Bili: x / Urobili: x   Blood: x / Protein: x / Nitrite: x   Leuk Esterase: x / RBC: x / WBC x   Sq Epi: x / Non Sq Epi: x / Bacteria: x          RADIOLOGY & ADDITIONAL TESTS:  Results Reviewed:   Imaging Personally Reviewed:  Electrocardiogram Personally Reviewed:    COORDINATION OF CARE:  Care Discussed with Consultants/Other Providers [Y/N]:  Prior or Outpatient Records Reviewed [Y/N]:   ***********************************************  Gaudencio Pereyra MD  Internal Medicine   PGY1  ***********************************************      PROGRESS NOTE:     Patient is a 54y old  Female who presents with a chief complaint of LLE DVT (11 Jun 2024 21:04)      SUBJECTIVE / OVERNIGHT EVENTS:    OVERNIGHT: No overnight events       Pt seen in AM at bedside, resting comfortably in bed, and does not appear to be in any acute distress. Mentions LLE pain and swelling improved. Some pain still with ambulation, but able to ambulate without significant difficulty. When asked, pt denies any recent or active fever, chills, nausea, vomiting, headache, acute sob, chest pain, abdominal pain, genitourinary sx, extremity pain or swelling.          MEDICATIONS  (STANDING):  heparin  Infusion. 1400 Unit(s)/Hr (14 mL/Hr) IV Continuous <Continuous>  losartan 25 milliGRAM(s) Oral daily    MEDICATIONS  (PRN):  acetaminophen     Tablet .. 650 milliGRAM(s) Oral every 6 hours PRN Temp greater or equal to 38C (100.4F), Mild Pain (1 - 3)  heparin   Injectable 75057 Unit(s) IV Push every 6 hours PRN For aPTT less than 40  heparin   Injectable 5000 Unit(s) IV Push every 6 hours PRN For aPTT between 40 - 57      CAPILLARY BLOOD GLUCOSE        I&O's Summary      PHYSICAL EXAM:  Vital Signs Last 24 Hrs  T(C): 36.4 (12 Jun 2024 05:20), Max: 36.9 (11 Jun 2024 22:00)  T(F): 97.5 (12 Jun 2024 05:20), Max: 98.4 (11 Jun 2024 22:00)  HR: 71 (12 Jun 2024 05:20) (68 - 79)  BP: 128/74 (12 Jun 2024 05:20) (118/80 - 134/68)  BP(mean): --  RR: 17 (12 Jun 2024 05:20) (16 - 17)  SpO2: 96% (12 Jun 2024 05:20) (96% - 100%)    Parameters below as of 12 Jun 2024 05:20  Patient On (Oxygen Delivery Method): room air        CONSTITUTIONAL: NAD; well-developed  HEENT: PERRL, clear conjunctiva  RESPIRATORY: Normal respiratory effort; lungs are clear to auscultation bilaterally; No Crackles/Rhonchi/Wheezing  CARDIOVASCULAR: Regular rate and rhythm, normal S1 and S2, no murmur/rub/gallop; No lower extremity edema; Peripheral pulses are 2+ bilaterally  ABDOMEN: Nontender to palpation, normoactive bowel sounds, no rebound/guarding; No hepatosplenomegaly  MUSCULOSKELETAL: no clubbing or cyanosis of digits; no joint swelling or tenderness to palpation  EXTREMITY: slight edema in LLE, primarily in ankle region   NEURO: A&Ox3; no focal deficits   PSYCH: normal mood; Affect appropirate    LABS:                        12.9   8.78  )-----------( 329      ( 11 Jun 2024 18:10 )             40.0     06-11    141  |  107  |  12  ----------------------------<  95  5.1   |  20<L>  |  0.68    Ca    9.5      11 Jun 2024 11:30    TPro  7.6  /  Alb  4.0  /  TBili  0.7  /  DBili  x   /  AST  33<H>  /  ALT  13  /  AlkPhos  85  06-11    PT/INR - ( 11 Jun 2024 11:30 )   PT: 11.0 sec;   INR: 0.98 ratio         PTT - ( 12 Jun 2024 02:06 )  PTT:167.0 sec      Urinalysis Basic - ( 11 Jun 2024 11:30 )    Color: x / Appearance: x / SG: x / pH: x  Gluc: 95 mg/dL / Ketone: x  / Bili: x / Urobili: x   Blood: x / Protein: x / Nitrite: x   Leuk Esterase: x / RBC: x / WBC x   Sq Epi: x / Non Sq Epi: x / Bacteria: x          RADIOLOGY & ADDITIONAL TESTS:  Results Reviewed:   Imaging Personally Reviewed:  Electrocardiogram Personally Reviewed:    COORDINATION OF CARE:  Care Discussed with Consultants/Other Providers [Y/N]:  Prior or Outpatient Records Reviewed [Y/N]:

## 2024-06-12 NOTE — DISCHARGE NOTE PROVIDER - NSDCFUADDAPPT_GEN_ALL_CORE_FT
APPTS ARE READY TO BE MADE: [ ] YES    Best Family or Patient Contact (if needed): Self    Additional Information about above appointments (if needed):    1: Dr. Quintana within 1 week post-discharge  2: Dr. Cantrell within 2-3 weeks post discharge    Other comments or requests:

## 2024-06-12 NOTE — DISCHARGE NOTE NURSING/CASE MANAGEMENT/SOCIAL WORK - PATIENT PORTAL LINK FT
You can access the FollowMyHealth Patient Portal offered by [facility] by registering at the following website: [email or web address]. By joining [employer]’s FollowMyHealth portal, you will also be able to view your health information using other applications (apps) compatible with our system.

## 2024-06-12 NOTE — PROGRESS NOTE ADULT - ASSESSMENT
54-year-old female with past medical history of HTN, presenting with left lower extremity pain and swelling of one week duration, US demonstrating acute LLE DVT, admitted for further management of acute DVT.

## 2024-06-12 NOTE — DISCHARGE NOTE PROVIDER - HOSPITAL COURSE
HPI  54-year-old female with pmh of HTN presenting with left lower extremity swelling x 1 week duration, worsening over the past few days. Also with pain of left lower extremity, especially in her calf upon walking. Patient denies any trauma, no falls, fevers, or chills. Patient is able to ambulate and reports no loss of sensation or weakness, denies claudication. Patient recently had an injection of L knee for osteoarthritis 2 weeks ago. Patient has a history of arthritis and reports outpatient MRI of knee was done which showed possible muscular tear. Patient denies any recent travel, no prolonged immobilization, no hospitalization, no personal history of clots, no usage of OCP or hormone replacement therapy, no family history of clots, no smoking history. Patient admits to not being up to date with cancer screening, including colonoscopy, mammogram, and Pap smear. In ED, VSS, labs notable for CRP 17, b/l LE duplex showing acute LLE DVT extending from left common femoral, femoral, popliteal, gastrocnemius, and posterior tibial veins. Patient admitted for management of acute DVT.     Hospital Course  Admitted to medicine service for management of acute DVT. Vascular surgery consulted for possible thrombectomy, but ultimately decided no acute surgical intervention and to follow-up with vascular surgery outpatient for surviellance. Patient was placed on Heparin gtt during admission and was switched to Eliquis 10mg BID. Patient stable and medically optimized for discharge. Patient discharged to home with close follow-up with vascular surgery.     Important Medication Changes and Reasons  -Medications stopped: None  -Medications started: Eliquis 10mg BID to be completed 6/19 and switched to 5mg BID on 6/20    Active or Pending Issues Requiring Follow-up  [ ] Follow-up with PCP regarding hypercoagulable workup   [ ] Follow-up with vascular surgery outpatient on Monday     Advanced Directives  Full Code    Discharge Diagnosis DVT   HPI  54-year-old female with pmh of HTN presenting with left lower extremity swelling x 1 week duration, worsening over the past few days. Also with pain of left lower extremity, especially in her calf upon walking. Patient denies any trauma, no falls, fevers, or chills. Patient is able to ambulate and reports no loss of sensation or weakness, denies claudication. Patient recently had an injection of L knee for osteoarthritis 2 weeks ago. Patient has a history of arthritis and reports outpatient MRI of knee was done which showed possible muscular tear. Patient denies any recent travel, no prolonged immobilization, no hospitalization, no personal history of clots, no usage of OCP or hormone replacement therapy, no family history of clots, no smoking history. Patient admits to not being up to date with cancer screening, including colonoscopy, mammogram, and Pap smear. In ED, VSS, labs notable for CRP 17, b/l LE duplex showing acute LLE DVT extending from left common femoral, femoral, popliteal, gastrocnemius, and posterior tibial veins. Patient admitted for management of acute DVT.     Hospital Course  Admitted to medicine service for management of acute DVT. Vascular surgery consulted for possible thrombectomy, but ultimately decided no acute surgical intervention and to follow-up with vascular surgery outpatient for surviellance. Patient was placed on Heparin gtt during admission and was switched to Eliquis 10mg BID. Patient stable and medically optimized for discharge. Patient discharged to home with close follow-up with vascular surgery..     Important Medication Changes and Reasons  -Medications stopped: None  -Medications started: Eliquis 10mg BID to be completed 6/19 and switched to 5mg BID on 6/20    Active or Pending Issues Requiring Follow-up  [ ] Follow-up with PCP regarding hypercoagulable workup   [ ] Follow-up with vascular surgery outpatient on Monday     Advanced Directives  Full Code    Discharge Diagnosis DVT

## 2024-06-12 NOTE — PROGRESS NOTE ADULT - PROBLEM SELECTOR PLAN 1
- presenting with LLE pain and swelling x 1 week duration   - VA duplex: showing Acute DVT involving the left common femoral, femoral, popliteal, gastrocnemius, and posterior tibial veins.  - no inciting factor: no recent prolonged immobilization, no trauma, no history of blood clots, no fhx, no OCP/hormone use, no smoking history   - start on heparin drip   - vascular consulted, appreciate recommendations, patient will be evaluated for candidacy for thrombectomy   - CT venogram to evaluate for proximal extension of DVT.  - hypercoagulable workup  - consider hematology consult inpatient versus outpatient given extensive DVT - presenting with LLE pain and swelling x 1 week duration   - VA duplex: showing Acute DVT involving the left common femoral, femoral, popliteal, gastrocnemius, and posterior tibial veins.  - no inciting factor: no recent prolonged immobilization, no trauma, no history of blood clots, no fhx, no OCP/hormone use, no smoking history   - vascular consulted, appreciate recommendations, patient will be evaluated for candidacy for thrombectomy   - CT venogram completed   - hypercoagulable workup---> f/u results outpatient   -started on heparin gtt--> given no procedure will transition to Eliquis

## 2024-06-12 NOTE — DISCHARGE NOTE PROVIDER - CARE PROVIDERS DIRECT ADDRESSES
[email or web address]

## 2024-06-12 NOTE — DISCHARGE NOTE PROVIDER - NSDCFUSCHEDAPPT_GEN_ALL_CORE_FT
[facility]  VASCULAR [id number]  Scheduled Appointment: 07/22/2024    Manvar-Singh, Pallavi  [facility]  VASCULAR [id number]  Scheduled Appointment: 07/22/2024

## 2024-06-12 NOTE — DISCHARGE NOTE PROVIDER - NSDCCPCAREPLAN_GEN_ALL_CORE_FT
PRINCIPAL DISCHARGE DIAGNOSIS  Diagnosis: Left leg DVT  Assessment and Plan of Treatment: You were admitted to the hospital due to swelling in your left leg that had been present for one week. Upon examination, it was determined that you have a condition called deep vein thrombosis (DVT). This means that a blood clot has formed in one or more of the deep veins in your body. DVT is a serious condition because the blood clot can travel to your lungs, causing a pulmonary embolism (PE). The common symptoms of DVT include swelling, pain, and redness in the affected leg. You underwent imaging studies, which revealed blood clots in the left common femoral, femoral, popliteal, gastrocnemius, and posterior tibial veins. These veins are located in your thigh, behind your knee, and in your calf. You were admitted to the hospital for treatment with blood thinners to prevent the clot from getting larger and to reduce the risk of a PE. Vascular surgery was consulted to determine if any surgical intervention was necessary, but it was decided that surgery was not needed at this time. You are being discharged with a new medication called Eliquis (apixaban). You should take 10 mg twice a day until June 19, and then reduce the dose to 5 mg twice a day starting on June 20. It is important to follow up with your primary care doctor and a vascular surgeon after discharge to monitor your condition. If you experience any alarming symptoms such as increased swelling, severe pain, shortness of breath, chest pain, or coughing up blood, seek medical care immediately as these could be signs of worsening DVT or a PE.  Please follow up with Vascular Surgeon Dr. Beltran outpatient within 1 week post-discharge. Please call office at [phone]

## 2024-06-12 NOTE — DISCHARGE NOTE PROVIDER - NSDCMRMEDTOKEN_GEN_ALL_CORE_FT
Eliquis Starter Pack for Treatment of DVT and PE 5 mg oral tablet: 1 tab(s) orally 2 times a day Starting on 6/13, please begin taking 10mg twice a day for 7 days. Then, starting on 6/20, switch to taking 5mg twice a day.  losartan 25 mg oral tablet: 1 tab(s) orally once a day as needed for htn takes as needed for SBP &gt; 130

## 2024-06-12 NOTE — DISCHARGE NOTE PROVIDER - PROVIDER TOKENS
PROVIDER:[TOKEN:[[id number]],FOLLOWUP:[1 week]],FREE:[LAST:[Tamia],FIRST:[Flakita],PHONE:[phone]],FAX:[(   )    -],ADDRESS:[[address] [place] [address]],FOLLOWUP:[1 week],ESTABLISHEDPATIENT:[T]]

## 2024-06-12 NOTE — CHART NOTE - NSCHARTNOTEFT_GEN_A_CORE
53 yo F PMHx of HTN PSHx cholecystectomy presents to ED w/ LLE pain and swelling x1 week found to have acute DVT above and below knee on LLE and proximal extent on LE duplex to common femoral vein. Imaging reviewed and currently patient on AC with adequate response. Patient under medical care for further hypercoagulability w/u currently hemodynamically stable, unlikely to require vascular surgery intervention at this time.     Recommendations:       Plan pending discussion w/ fellow on behalf of attending.    Vascular Surgery   [id number] 55 yo F PMHx of HTN PSHx cholecystectomy presents to ED w/ LLE pain and swelling x1 week found to have acute DVT above and below knee on LLE and proximal extent on LE duplex to common femoral vein. Imaging reviewed and currently patient on AC with adequate response. Patient under medical care for further hypercoagulability w/u currently hemodynamically stable, unlikely to require vascular surgery intervention at this time.     Recommendations:   - no acute vascular surgical interventions at this time   - patient may be discharged on anticoagulation from a vascular surgery standpoint   - Upon discharge, patient should follow up with Dr. Delgado outpatient at her [place] Office on Monday 6/17/24. Please have the patient call the office at [phone] to schedule a time for her appointment    Vascular Surgery   [id number]

## 2024-06-12 NOTE — PROGRESS NOTE ADULT - PROBLEM SELECTOR PLAN 3
Diet: Regular diet   DVT: on Heparin ggt  Dispo: pending clinical course Diet: Regular diet   DVT: Eliquis   Dispo: pending clinical course

## 2024-06-13 PROBLEM — Z00.00 ENCOUNTER FOR PREVENTIVE HEALTH EXAMINATION: Status: ACTIVE | Noted: 2024-06-13

## 2024-06-13 LAB
B2 GLYCOPROT1 AB SER QL: NEGATIVE — SIGNIFICANT CHANGE UP
CARDIOLIPIN AB SER-ACNC: NEGATIVE — SIGNIFICANT CHANGE UP
PROT S FREE PPP-ACNC: 101 % — SIGNIFICANT CHANGE UP (ref 70–140)

## 2024-06-17 ENCOUNTER — APPOINTMENT (OUTPATIENT)
Dept: VASCULAR SURGERY | Facility: CLINIC | Age: 54
End: 2024-06-17

## 2024-06-17 VITALS
TEMPERATURE: 98 F | WEIGHT: 270 LBS | SYSTOLIC BLOOD PRESSURE: 109 MMHG | OXYGEN SATURATION: 98 % | DIASTOLIC BLOOD PRESSURE: 73 MMHG | HEART RATE: 81 BPM | BODY MASS INDEX: 42.38 KG/M2 | HEIGHT: 67 IN

## 2024-06-17 DIAGNOSIS — Z86.79 PERSONAL HISTORY OF OTHER DISEASES OF THE CIRCULATORY SYSTEM: ICD-10-CM

## 2024-06-17 DIAGNOSIS — Z82.49 FAMILY HISTORY OF ISCHEMIC HEART DISEASE AND OTHER DISEASES OF THE CIRCULATORY SYSTEM: ICD-10-CM

## 2024-06-17 PROCEDURE — XXXXX: CPT | Mod: 1L

## 2024-06-17 RX ORDER — APIXABAN 5 MG/1
TABLET, FILM COATED ORAL
Refills: 0 | Status: ACTIVE | COMMUNITY

## 2024-07-23 ENCOUNTER — APPOINTMENT (OUTPATIENT)
Dept: VASCULAR SURGERY | Facility: CLINIC | Age: 54
End: 2024-07-23

## 2024-07-24 ENCOUNTER — APPOINTMENT (OUTPATIENT)
Dept: VASCULAR SURGERY | Facility: CLINIC | Age: 54
End: 2024-07-24

## 2024-09-19 NOTE — PROVIDER CONTACT NOTE (CRITICAL VALUE NOTIFICATION) - BACKGROUND
53 y/o female admitted for DVT with hx of HTN, cholecystectomy The patient is a 69y Female complaining of shortness of breath.